# Patient Record
Sex: MALE | Race: BLACK OR AFRICAN AMERICAN | Employment: FULL TIME | URBAN - METROPOLITAN AREA
[De-identification: names, ages, dates, MRNs, and addresses within clinical notes are randomized per-mention and may not be internally consistent; named-entity substitution may affect disease eponyms.]

---
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
## 2020-01-27 ENCOUNTER — HOSPITAL ENCOUNTER (EMERGENCY)
Age: 20
Discharge: HOME | End: 2020-01-27
Attending: EMERGENCY MEDICINE
Payer: COMMERCIAL

## 2020-01-27 VITALS
OXYGEN SATURATION: 100 % | SYSTOLIC BLOOD PRESSURE: 125 MMHG | HEIGHT: 71 IN | TEMPERATURE: 98 F | HEART RATE: 73 BPM | WEIGHT: 132.31 LBS | BODY MASS INDEX: 18.52 KG/M2 | DIASTOLIC BLOOD PRESSURE: 90 MMHG | RESPIRATION RATE: 16 BRPM

## 2020-01-27 DIAGNOSIS — Z02.89 ENCOUNTER TO OBTAIN EXCUSE FROM WORK: ICD-10-CM

## 2020-01-27 DIAGNOSIS — R23.1 PALE COMPLEXION: Primary | ICD-10-CM

## 2020-01-27 LAB
BASOPHILS # BLD: 0 K/UL (ref 0–0.1)
BASOPHILS NFR BLD: 1 % (ref 0–1)
DIFFERENTIAL METHOD BLD: NORMAL
EOSINOPHIL # BLD: 0 K/UL (ref 0–0.4)
EOSINOPHIL NFR BLD: 1 % (ref 0–7)
ERYTHROCYTE [DISTWIDTH] IN BLOOD BY AUTOMATED COUNT: 12.5 % (ref 11.5–14.5)
HCT VFR BLD AUTO: 47.5 % (ref 36.6–50.3)
HGB BLD-MCNC: 15.6 G/DL (ref 12.1–17)
IMM GRANULOCYTES # BLD AUTO: 0 K/UL (ref 0–0.04)
IMM GRANULOCYTES NFR BLD AUTO: 0 % (ref 0–0.5)
LYMPHOCYTES # BLD: 1.6 K/UL (ref 0.8–3.5)
LYMPHOCYTES NFR BLD: 38 % (ref 12–49)
MCH RBC QN AUTO: 27.6 PG (ref 26–34)
MCHC RBC AUTO-ENTMCNC: 32.8 G/DL (ref 30–36.5)
MCV RBC AUTO: 83.9 FL (ref 80–99)
MONOCYTES # BLD: 0.3 K/UL (ref 0–1)
MONOCYTES NFR BLD: 7 % (ref 5–13)
NEUTS SEG # BLD: 2.3 K/UL (ref 1.8–8)
NEUTS SEG NFR BLD: 53 % (ref 32–75)
NRBC # BLD: 0 K/UL (ref 0–0.01)
NRBC BLD-RTO: 0 PER 100 WBC
PLATELET # BLD AUTO: 296 K/UL (ref 150–400)
PMV BLD AUTO: 9.7 FL (ref 8.9–12.9)
RBC # BLD AUTO: 5.66 M/UL (ref 4.1–5.7)
WBC # BLD AUTO: 4.2 K/UL (ref 4.1–11.1)

## 2020-01-27 PROCEDURE — 85025 COMPLETE CBC W/AUTO DIFF WBC: CPT

## 2020-01-27 PROCEDURE — 36415 COLL VENOUS BLD VENIPUNCTURE: CPT

## 2020-01-27 PROCEDURE — 99282 EMERGENCY DEPT VISIT SF MDM: CPT

## 2020-01-27 NOTE — ED PROVIDER NOTES
EMERGENCY DEPARTMENT HISTORY AND PHYSICAL EXAM      Date: 1/27/2020  Patient Name: Jacob Díaz    History of Presenting Illness     Chief Complaint   Patient presents with    Letter for School/Work       History Provided By: Patient    HPI: Jacob Díaz, 23 y.o. male with PMHx significant for asthma. 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 The patient states that the provider who examined him at work did not send him to any primary doctors or write prescription for blood work. Currently denies any chest pain, shortness of breath, abdominal pain, flank pain, syncopal episodes or have any stool movements that are black and tarry or red. He feels like his usual self denies any other acute complaints. Please note for examination and HPI were completed I did introduce myself as the physician assistant. Please note that this dictation was completed with Jemstep, the computer voice recognition software. Quite often unanticipated grammatical, syntax, homophones, and other interpretive errors are inadvertently transcribed by the computer software. Please disregard these errors. Please excuse any errors that have escaped final proofreading. For further clarification on any chart please contact myself. Thank you. There are no other complaints, changes, or physical findings at this time. PCP: None    No current facility-administered medications on file prior to encounter. Current Outpatient Medications on File Prior to Encounter   Medication Sig Dispense Refill    beclomethasone (QVAR) 80 mcg/actuation inhaler Take 2 Puffs by inhalation two (2) times a day.  ALBUTEROL SULFATE (PROAIR HFA IN) Take 2 Puffs by inhalation as needed.       ibuprofen (MOTRIN) 600 mg tablet Take 1 Tab by mouth every six (6) hours as needed for Pain. 20 Tab 0       Past History     Past Medical History:  Past Medical History:   Diagnosis Date    Asthma     Lead poisoning        Past Surgical History:  History reviewed. No pertinent surgical history. Family History:  Family History   Problem Relation Age of Onset    Diabetes Mother     Seizures Mother     Cancer Father         ? where - I think it was prostate cancer.  Diabetes Maternal Grandmother     Thyroid Disease Maternal Grandmother     Cancer Maternal Grandfather 78        pancreatic       Social History:  Social History     Tobacco Use    Smoking status: Never Smoker    Smokeless tobacco: Never Used   Substance Use Topics    Alcohol use: No    Drug use: No       Allergies:  No Known Allergies      Review of Systems   Review of Systems   All other systems reviewed and are negative. Physical Exam   Physical Exam  Vitals signs and nursing note reviewed. Constitutional:       Appearance: He is well-developed. HENT:      Head: Normocephalic and atraumatic. Eyes:      Conjunctiva/sclera: Conjunctivae normal.      Pupils: Pupils are equal, round, and reactive to light. Neck:      Musculoskeletal: Normal range of motion and neck supple. Thyroid: No thyromegaly. Cardiovascular:      Rate and Rhythm: Normal rate and regular rhythm. Heart sounds: Normal heart sounds. No murmur. Pulmonary:      Effort: Pulmonary effort is normal. No respiratory distress. Breath sounds: Normal breath sounds. No stridor. No wheezing. Abdominal:      General: Bowel sounds are normal.      Palpations: Abdomen is soft. Tenderness: There is no tenderness. Musculoskeletal: Normal range of motion. General: No tenderness. Lymphadenopathy:      Cervical: No cervical adenopathy. Skin:     General: Skin is warm. Neurological:      Mental Status: He is alert and oriented to person, place, and time.       Deep Tendon Reflexes: Reflexes are normal and symmetric. Psychiatric:         Judgment: Judgment normal.         Diagnostic Study Results     Labs -     Recent Results (from the past 12 hour(s))   CBC WITH AUTOMATED DIFF    Collection Time: 01/27/20  3:23 PM   Result Value Ref Range    WBC 4.2 4.1 - 11.1 K/uL    RBC 5.66 4.10 - 5.70 M/uL    HGB 15.6 12.1 - 17.0 g/dL    HCT 47.5 36.6 - 50.3 %    MCV 83.9 80.0 - 99.0 FL    MCH 27.6 26.0 - 34.0 PG    MCHC 32.8 30.0 - 36.5 g/dL    RDW 12.5 11.5 - 14.5 %    PLATELET 739 730 - 895 K/uL    MPV 9.7 8.9 - 12.9 FL    NRBC 0.0 0  WBC    ABSOLUTE NRBC 0.00 0.00 - 0.01 K/uL    NEUTROPHILS 53 32 - 75 %    LYMPHOCYTES 38 12 - 49 %    MONOCYTES 7 5 - 13 %    EOSINOPHILS 1 0 - 7 %    BASOPHILS 1 0 - 1 %    IMMATURE GRANULOCYTES 0 0.0 - 0.5 %    ABS. NEUTROPHILS 2.3 1.8 - 8.0 K/UL    ABS. LYMPHOCYTES 1.6 0.8 - 3.5 K/UL    ABS. MONOCYTES 0.3 0.0 - 1.0 K/UL    ABS. EOSINOPHILS 0.0 0.0 - 0.4 K/UL    ABS. BASOPHILS 0.0 0.0 - 0.1 K/UL    ABS. IMM. GRANS. 0.0 0.00 - 0.04 K/UL    DF AUTOMATED         Radiologic Studies -   No orders to display     CT Results  (Last 48 hours)    None        CXR Results  (Last 48 hours)    None            Medical Decision Making   I am the first provider for this patient. I reviewed the vital signs, available nursing notes, past medical history, past surgical history, family history and social history. Vital Signs-Reviewed the patient's vital signs. Patient Vitals for the past 12 hrs:   Temp Pulse Resp BP SpO2   01/27/20 1401 98 °F (36.7 °C) 73 16 125/90 100 %       Records Reviewed: Nursing Notes    Provider Notes (Medical Decision Making): This time we did examine CBC and CBC was in within normal limits. I did encourage patient to follow-up with primary doctors however he can obtain records from medical records and at this time we will go ahead and discharge patient in stable condition to follow-up on outpatient basis.     ED Course:   Initial assessment performed. The patients presenting problems have been discussed, and they are in agreement with the care plan formulated and outlined with them. I have encouraged them to ask questions as they arise throughout their visit. Critical Care Time: None    Disposition:  Dispo    PLAN:  1. Current Discharge Medication List        2. Follow-up Information     Follow up With Specialties Details Why [address] Internal Medicine   Summer Hurtado  [address]  [address] [facility]  [phone]        Return to ED if worse     Diagnosis     Clinical Impression:   1. Pale complexion    2. Encounter to obtain excuse from work          Please note that this dictation was completed with Lumiy, the Xirrus voice recognition software. Quite often unanticipated grammatical, syntax, homophones, and other interpretive errors are inadvertently transcribed by the computer software. Please disregards these errors. Please excuse any errors that have escaped final proofreading. This note will not be viewable in [address]

## 2020-01-27 NOTE — ED NOTES
Discharge instructions were given to the patient by ORDOLFO Melendez. The patient left the Emergency Department ambulatory, alert and oriented and in no acute distress with 0 prescriptions. The patient was encouraged to call or return to the ED for worsening issues or problems and was encouraged to schedule a follow up appointment for continuing care. The patient verbalized understanding of discharge instructions and prescriptions, all questions were answered. The patient has no further concerns at this time.

## 2020-01-27 NOTE — ED TRIAGE NOTES
Pt reports he needs a note saying he is cleared to return to work. Pt reports he had a physical at work and the nurse said he \"looked pale. \"

## 2020-01-27 NOTE — ED NOTES
Pt presents ambulatory to ED stating he was told during his physical that he appears pale and needed lab work. Pt denies any symptoms at this time. Pt is alert and oriented x 4, RR even and unlabored, skin is warm and dry. Assesment completed and pt updated on plan of care. Emergency Department Nursing Plan of Care       The Nursing Plan of Care is developed from the Nursing assessment and Emergency Department Attending provider initial evaluation. The plan of care may be reviewed in the ED Provider note.     The Plan of Care was developed with the following considerations:   Patient / Family readiness to learn indicated by:verbalized understanding  Persons(s) to be included in education: patient  Barriers to Learning/Limitations:No    [address]    1/27/2020   3:07 PM

## 2020-02-05 ENCOUNTER — HOSPITAL ENCOUNTER (OUTPATIENT)
Dept: GENERAL RADIOLOGY | Age: 20
Discharge: HOME | End: 2020-02-05
Payer: COMMERCIAL

## 2020-02-05 DIAGNOSIS — T14.8XXA FRACTURE: ICD-10-CM

## 2020-02-05 PROCEDURE — 73140 X-RAY EXAM OF FINGER(S): CPT

## 2022-03-20 PROBLEM — E80.4 GILBERT SYNDROME: Status: ACTIVE | Noted: 2017-01-03

## 2022-11-26 ENCOUNTER — HOSPITAL ENCOUNTER (EMERGENCY)
Age: 22
Discharge: HOME | End: 2022-11-26
Attending: EMERGENCY MEDICINE
Payer: COMMERCIAL

## 2022-11-26 ENCOUNTER — APPOINTMENT (OUTPATIENT)
Dept: GENERAL RADIOLOGY | Age: 22
End: 2022-11-26
Attending: NURSE PRACTITIONER
Payer: COMMERCIAL

## 2022-11-26 VITALS
HEIGHT: 71 IN | DIASTOLIC BLOOD PRESSURE: 78 MMHG | SYSTOLIC BLOOD PRESSURE: 112 MMHG | BODY MASS INDEX: 20.3 KG/M2 | WEIGHT: 145 LBS | HEART RATE: 90 BPM | RESPIRATION RATE: 19 BRPM | OXYGEN SATURATION: 97 % | TEMPERATURE: 99.4 F

## 2022-11-26 DIAGNOSIS — J10.1 INFLUENZA A: Primary | ICD-10-CM

## 2022-11-26 LAB
ALBUMIN SERPL-MCNC: 4.6 G/DL (ref 3.5–5)
ALBUMIN/GLOB SERPL: 1 {RATIO} (ref 1.1–2.2)
ALP SERPL-CCNC: 82 U/L (ref 45–117)
ALT SERPL-CCNC: 62 U/L (ref 12–78)
ANION GAP SERPL CALC-SCNC: 11 MMOL/L (ref 5–15)
APPEARANCE UR: CLEAR
AST SERPL-CCNC: 44 U/L (ref 15–37)
BACTERIA URNS QL MICRO: NEGATIVE /HPF
BASOPHILS # BLD: 0 K/UL (ref 0–0.1)
BASOPHILS NFR BLD: 0 % (ref 0–1)
BILIRUB SERPL-MCNC: 2.8 MG/DL (ref 0.2–1)
BILIRUB UR QL CFM: NEGATIVE
BUN SERPL-MCNC: 12 MG/DL (ref 6–20)
BUN/CREAT SERPL: 13 (ref 12–20)
CALCIUM SERPL-MCNC: 9.5 MG/DL (ref 8.5–10.1)
CHLORIDE SERPL-SCNC: 95 MMOL/L (ref 97–108)
CO2 SERPL-SCNC: 31 MMOL/L (ref 21–32)
COLOR UR: ABNORMAL
CREAT SERPL-MCNC: 0.89 MG/DL (ref 0.7–1.3)
DIFFERENTIAL METHOD BLD: ABNORMAL
EOSINOPHIL # BLD: 0 K/UL (ref 0–0.4)
EOSINOPHIL NFR BLD: 0 % (ref 0–7)
EPITH CASTS URNS QL MICRO: ABNORMAL /LPF
ERYTHROCYTE [DISTWIDTH] IN BLOOD BY AUTOMATED COUNT: 12.5 % (ref 11.5–14.5)
FLUAV AG NPH QL IA: POSITIVE
FLUBV AG NOSE QL IA: NEGATIVE
GLOBULIN SER CALC-MCNC: 4.6 G/DL (ref 2–4)
GLUCOSE SERPL-MCNC: 78 MG/DL (ref 65–100)
GLUCOSE UR STRIP.AUTO-MCNC: NEGATIVE MG/DL
HCT VFR BLD AUTO: 47.2 % (ref 36.6–50.3)
HGB BLD-MCNC: 15.8 G/DL (ref 12.1–17)
HGB UR QL STRIP: ABNORMAL
IMM GRANULOCYTES # BLD AUTO: 0 K/UL (ref 0–0.04)
IMM GRANULOCYTES NFR BLD AUTO: 0 % (ref 0–0.5)
KETONES UR QL STRIP.AUTO: >80 MG/DL
LEUKOCYTE ESTERASE UR QL STRIP.AUTO: ABNORMAL
LYMPHOCYTES # BLD: 1 K/UL (ref 0.8–3.5)
LYMPHOCYTES NFR BLD: 22 % (ref 12–49)
MCH RBC QN AUTO: 27.8 PG (ref 26–34)
MCHC RBC AUTO-ENTMCNC: 33.5 G/DL (ref 30–36.5)
MCV RBC AUTO: 83 FL (ref 80–99)
MONOCYTES # BLD: 0.8 K/UL (ref 0–1)
MONOCYTES NFR BLD: 18 % (ref 5–13)
NEUTS SEG # BLD: 2.7 K/UL (ref 1.8–8)
NEUTS SEG NFR BLD: 60 % (ref 32–75)
NITRITE UR QL STRIP.AUTO: NEGATIVE
NRBC # BLD: 0 K/UL (ref 0–0.01)
NRBC BLD-RTO: 0 PER 100 WBC
PH UR STRIP: 5.5 [PH] (ref 5–8)
PLATELET # BLD AUTO: 238 K/UL (ref 150–400)
PMV BLD AUTO: 9.9 FL (ref 8.9–12.9)
POTASSIUM SERPL-SCNC: 3.5 MMOL/L (ref 3.5–5.1)
PROT SERPL-MCNC: 9.2 G/DL (ref 6.4–8.2)
PROT UR STRIP-MCNC: 30 MG/DL
RBC # BLD AUTO: 5.69 M/UL (ref 4.1–5.7)
RBC #/AREA URNS HPF: ABNORMAL /HPF (ref 0–5)
SODIUM SERPL-SCNC: 137 MMOL/L (ref 136–145)
SP GR UR REFRACTOMETRY: 1.03 (ref 1–1.03)
UA: UC IF INDICATED,UAUC: ABNORMAL
UROBILINOGEN UR QL STRIP.AUTO: 1 EU/DL (ref 0.2–1)
WBC # BLD AUTO: 4.6 K/UL (ref 4.1–11.1)
WBC URNS QL MICRO: ABNORMAL /HPF (ref 0–4)

## 2022-11-26 PROCEDURE — 81001 URINALYSIS AUTO W/SCOPE: CPT

## 2022-11-26 PROCEDURE — 99284 EMERGENCY DEPT VISIT MOD MDM: CPT

## 2022-11-26 PROCEDURE — 74011250636 HC RX REV CODE- 250/636: Performed by: NURSE PRACTITIONER

## 2022-11-26 PROCEDURE — 87804 INFLUENZA ASSAY W/OPTIC: CPT

## 2022-11-26 PROCEDURE — 71045 X-RAY EXAM CHEST 1 VIEW: CPT

## 2022-11-26 PROCEDURE — 80053 COMPREHEN METABOLIC PANEL: CPT

## 2022-11-26 PROCEDURE — 85025 COMPLETE CBC W/AUTO DIFF WBC: CPT

## 2022-11-26 PROCEDURE — 36415 COLL VENOUS BLD VENIPUNCTURE: CPT

## 2022-11-26 RX ORDER — IBUPROFEN 600 MG/1
600 TABLET ORAL
Qty: 20 TABLET | Refills: 0 | Status: SHIPPED | OUTPATIENT
Start: 2022-11-26

## 2022-11-26 RX ORDER — DEXTROMETHORPHAN HYDROBROMIDE, GUAIFENESIN 20; 400 MG/20ML; MG/20ML
SOLUTION ORAL
Qty: 118 ML | Refills: 0 | Status: SHIPPED | OUTPATIENT
Start: 2022-11-26

## 2022-11-26 RX ADMIN — SODIUM CHLORIDE 1000 ML: 9 INJECTION, SOLUTION INTRAVENOUS at 11:23

## 2022-11-26 NOTE — ED PROVIDER NOTES
EMERGENCY DEPARTMENT HISTORY AND PHYSICAL EXAM          Date: 11/26/2022  Patient Name: Karon Darden    History of Presenting Illness     Chief Complaint   Patient presents with    Cough     Per pt reports productive cough with green sputum x 3 weeks, +chills and fever. History Provided By: Patient    Chief Complaint: fatigue  Duration: 3 Weeks  Timing:  Acute  Location: generalized body weakness  Quality:  feeling tired no energy  Severity: Moderate  Modifying Factors: none  Associated Symptoms:  Cough no appetite  chills    HPI: Karon Darden is a 25 y.o. male with a PMH of asthma who presents with fatigue acute onset 3 weeks ago. Patient states she has has been tired is had no energy. Patient states past 2 days he has had hot and cold chills and a fever. Patient states she has not felt like eating. He denies abdominal pain nausea vomiting diarrhea wheezing shortness of breath or chest pain. PCP: None    Current Outpatient Medications   Medication Sig Dispense Refill    ibuprofen (MOTRIN) 600 mg tablet Take 1 Tablet by mouth every six (6) hours as needed for Pain. 20 Tablet 0    dextromethorphan-guaiFENesin (Robitussin Cough-Chest Dallin DM) 5-100 mg/5 mL liqd Take 10 ml every 6 hours as needed for cough 118 mL 0    beclomethasone (QVAR) 80 mcg/actuation aero Take 2 Puffs by inhalation two (2) times a day. (Patient not taking: Reported on 11/26/2022)      ALBUTEROL SULFATE (PROAIR HFA IN) Take 2 Puffs by inhalation as needed. (Patient not taking: Reported on 11/26/2022)         Past History     Past Medical History:  Past Medical History:   Diagnosis Date    Asthma     Lead poisoning        Past Surgical History:  History reviewed. No pertinent surgical history. Family History:  Family History   Problem Relation Age of Onset    Diabetes Mother     Seizures Mother     Cancer Father         ? where - I think it was prostate cancer.     Diabetes Maternal Grandmother     Thyroid Disease Maternal Grandmother     Cancer Maternal Grandfather 78        pancreatic       Social History:  Social History     Tobacco Use    Smoking status: Never    Smokeless tobacco: Never   Substance Use Topics    Alcohol use: No    Drug use: No       Allergies:  No Known Allergies      Review of Systems   Review of Systems   Constitutional:  Positive for activity change, chills and fatigue. Negative for fever. HENT:  Negative for congestion and sore throat. Eyes:  Negative for redness. Respiratory:  Positive for cough. Negative for chest tightness and wheezing. Cardiovascular:  Negative for chest pain. Gastrointestinal:  Negative for abdominal pain. Genitourinary:  Negative for dysuria. Musculoskeletal:  Negative for arthralgias, back pain, myalgias, neck pain and neck stiffness. Skin:  Negative for rash. Neurological:  Negative for dizziness, syncope, weakness, light-headedness, numbness and headaches. Hematological:  Negative for adenopathy. All other systems reviewed and are negative. Physical Exam     Vitals:    11/26/22 1041   BP: 112/78   Pulse: 90   Resp: 19   Temp: 99.4 °F (37.4 °C)   SpO2: 97%   Weight: 65.8 kg (145 lb)   Height: 5' 11\" (1.803 m)     Physical Exam  Vitals and nursing note reviewed. Constitutional:       Appearance: He is well-developed. Comments: Tired appearing 26-year-old   HENT:      Head: Normocephalic and atraumatic. Right Ear: External ear normal.      Mouth/Throat:      Mouth: Mucous membranes are dry. Pharynx: No oropharyngeal exudate or posterior oropharyngeal erythema. Eyes:      General:         Right eye: No discharge. Left eye: No discharge. Conjunctiva/sclera: Conjunctivae normal.   Cardiovascular:      Rate and Rhythm: Normal rate and regular rhythm. Heart sounds: Normal heart sounds. Pulmonary:      Effort: Pulmonary effort is normal. No respiratory distress. Breath sounds: Normal breath sounds. No wheezing. Abdominal:      General: Bowel sounds are normal.      Palpations: Abdomen is soft. Tenderness: There is no abdominal tenderness. Musculoskeletal:         General: Normal range of motion. Cervical back: Normal range of motion and neck supple. Lymphadenopathy:      Cervical: No cervical adenopathy. Skin:     General: Skin is warm and dry. Neurological:      Mental Status: He is alert and oriented to person, place, and time. Cranial Nerves: No cranial nerve deficit. Psychiatric:         Behavior: Behavior normal.         Thought Content: Thought content normal.         Judgment: Judgment normal.             Medical Decision Making   I am the first provider for this patient. I reviewed the vital signs, available nursing notes, past medical history, past surgical history, family history and social history. Vital Signs-Reviewed the patient's vital signs. Records Reviewed: Nursing Notes    Provider Notes (Medical Decision Making):   DDX influenza dehydration URI            Procedures:  Procedures    Diagnostic Study Results     Labs -     Recent Results (from the past 12 hour(s))   METABOLIC PANEL, COMPREHENSIVE    Collection Time: 11/26/22 11:15 AM   Result Value Ref Range    Sodium 137 136 - 145 mmol/L    Potassium 3.5 3.5 - 5.1 mmol/L    Chloride 95 (L) 97 - 108 mmol/L    CO2 31 21 - 32 mmol/L    Anion gap 11 5 - 15 mmol/L    Glucose 78 65 - 100 mg/dL    BUN 12 6 - 20 MG/DL    Creatinine 0.89 0.70 - 1.30 MG/DL    BUN/Creatinine ratio 13 12 - 20      eGFR >60 >60 ml/min/1.73m2    Calcium 9.5 8.5 - 10.1 MG/DL    Bilirubin, total 2.8 (H) 0.2 - 1.0 MG/DL    ALT (SGPT) 62 12 - 78 U/L    AST (SGOT) 44 (H) 15 - 37 U/L    Alk.  phosphatase 82 45 - 117 U/L    Protein, total 9.2 (H) 6.4 - 8.2 g/dL    Albumin 4.6 3.5 - 5.0 g/dL    Globulin 4.6 (H) 2.0 - 4.0 g/dL    A-G Ratio 1.0 (L) 1.1 - 2.2     CBC WITH AUTOMATED DIFF    Collection Time: 11/26/22 11:15 AM   Result Value Ref Range    WBC 4.6 4.1 - 11.1 K/uL    RBC 5.69 4.10 - 5.70 M/uL    HGB 15.8 12.1 - 17.0 g/dL    HCT 47.2 36.6 - 50.3 %    MCV 83.0 80.0 - 99.0 FL    MCH 27.8 26.0 - 34.0 PG    MCHC 33.5 30.0 - 36.5 g/dL    RDW 12.5 11.5 - 14.5 %    PLATELET 381 885 - 947 K/uL    MPV 9.9 8.9 - 12.9 FL    NRBC 0.0 0  WBC    ABSOLUTE NRBC 0.00 0.00 - 0.01 K/uL    NEUTROPHILS 60 32 - 75 %    LYMPHOCYTES 22 12 - 49 %    MONOCYTES 18 (H) 5 - 13 %    EOSINOPHILS 0 0 - 7 %    BASOPHILS 0 0 - 1 %    IMMATURE GRANULOCYTES 0 0.0 - 0.5 %    ABS. NEUTROPHILS 2.7 1.8 - 8.0 K/UL    ABS. LYMPHOCYTES 1.0 0.8 - 3.5 K/UL    ABS. MONOCYTES 0.8 0.0 - 1.0 K/UL    ABS. EOSINOPHILS 0.0 0.0 - 0.4 K/UL    ABS. BASOPHILS 0.0 0.0 - 0.1 K/UL    ABS. IMM. GRANS. 0.0 0.00 - 0.04 K/UL    DF AUTOMATED     URINALYSIS W/ REFLEX CULTURE    Collection Time: 11/26/22 11:15 AM    Specimen: Miscellaneous sample    Urine specimen   Result Value Ref Range    Color DARK YELLOW      Appearance CLEAR CLEAR      Specific gravity 1.030 1.003 - 1.030      pH (UA) 5.5 5.0 - 8.0      Protein 30 (A) NEG mg/dL    Glucose Negative NEG mg/dL    Ketone >80 (A) NEG mg/dL    Blood MODERATE (A) NEG      Urobilinogen 1.0 0.2 - 1.0 EU/dL    Nitrites Negative NEG      Leukocyte Esterase TRACE (A) NEG      WBC 0-4 0 - 4 /hpf    RBC 0-5 0 - 5 /hpf    Epithelial cells MODERATE (A) FEW /lpf    Bacteria Negative NEG /hpf    UA:UC IF INDICATED CULTURE NOT INDICATED BY UA RESULT CNI     INFLUENZA A+B VIRAL AGS    Collection Time: 11/26/22 11:15 AM   Result Value Ref Range    Influenza A Antigen Positive (A) NEG      Influenza B Antigen Negative NEG     BILIRUBIN, CONFIRM    Collection Time: 11/26/22 11:15 AM   Result Value Ref Range    Bilirubin UA, confirm Negative NEG         Radiologic Studies -   XR CHEST PORT   Final Result   No acute cardiopulmonary process.            CT Results  (Last 48 hours)      None          CXR Results  (Last 48 hours)                 11/26/22 1134  XR CHEST PORT Final result Impression:  No acute cardiopulmonary process. Narrative:  EXAM:  XR CHEST PORT       INDICATION:   chest pain       COMPARISON: Chest radiograph 5/10/2019. FINDINGS: AP radiograph of the chest was obtained. No evidence of focal consolidation. No pleural effusion or pneumothorax. Heart,   kendal, mediastinum are within normal limits. No acute osseous abnormalities. Disposition:  home    DISCHARGE NOTE:         Care plan outlined and precautions discussed. Patient has no new complaints, changes, or physical findings. Results of tests were reviewed with the patient. All medications were reviewed with the patient; will d/c home with robitussin dm. All of pt's questions and concerns were addressed. Patient was instructed and agrees to follow up with PCP, as well as to return to the ED upon further deterioration. Patient is ready to go home. Follow-up Information       Follow up With Specialties Details Why [address] Internal Medicine In 1 week As needed Summer Hurtado  [id number] [place] [address] [address]            Current Discharge Medication List        START taking these medications    Details   dextromethorphan-guaiFENesin (Robitussin Cough-Chest Dallin DM) 5-100 mg/5 mL liqd Take 10 ml every 6 hours as needed for cough  Qty: 118 mL, Refills: 0  Start date: 11/26/2022           CONTINUE these medications which have CHANGED    Details   ibuprofen (MOTRIN) 600 mg tablet Take 1 Tablet by mouth every six (6) hours as needed for Pain. Qty: 20 Tablet, Refills: 0  Start date: 11/26/2022               Please note that this dictation was completed with Dragon, computer voice recognition software. Quite often unanticipated grammatical, syntax, homophones, and other interpretive errors are inadvertently transcribed by the computer software. Please disregard these errors.   Additionally, please excuse any errors that have escaped final proofreading. Diagnosis     Clinical Impression:   1.  Influenza A

## 2022-11-26 NOTE — LETTER
[facility] EMERGENCY DEPT  [address] [facility] [address] [phone]    Work/School Note    Date: 11/26/2022    To Whom It May concern:    Isis Santizo was seen and treated today in the emergency room by the following provider(s):  Attending Provider: Pete Andrews MD  Nurse Practitioner: Rossy Treadwell NP. Isis Santizo may return to work on 11-29-22.     Sincerely,          Emir Hilliard NP

## 2022-11-26 NOTE — ED NOTES
Patient (s)  given copy of dc instructions and 2 script(s). Patient (s)  verbalized understanding of instructions and script (s). Patient given a current medication reconciliation form and verbalized understanding of their medications. Patient (s) verbalized understanding of the importance of discussing medications with  his or her physician or clinic they will be following up with. Patient alert and oriented and in no acute distress. Patient discharged home ambulatory with self. Patient advised [employer] will cover the test.  He would like to schedule it if his PCP suggest.

## 2022-11-26 NOTE — ED NOTES
Emergency Department Nursing Plan of Care       The Nursing Plan of Care is developed from the Nursing assessment and Emergency Department Attending provider initial evaluation. The plan of care may be reviewed in the ED Provider note.     The Plan of Care was developed with the following considerations:   Patient / Family readiness to learn indicated by:verbalized understanding  Persons(s) to be included in education: patient  Barriers to Learning/Limitations:No    Signed     Sandra Nuno RN    11/26/2022   11:18 AM

## 2024-06-26 ENCOUNTER — HOSPITAL ENCOUNTER (EMERGENCY)
Facility: HOSPITAL | Age: 24
Discharge: HOME | End: 2024-06-26
Payer: COMMERCIAL

## 2024-06-26 VITALS
DIASTOLIC BLOOD PRESSURE: 64 MMHG | BODY MASS INDEX: 21 KG/M2 | SYSTOLIC BLOOD PRESSURE: 102 MMHG | RESPIRATION RATE: 16 BRPM | HEART RATE: 76 BPM | HEIGHT: 71 IN | WEIGHT: 150 LBS | TEMPERATURE: 97.9 F | OXYGEN SATURATION: 98 %

## 2024-06-26 DIAGNOSIS — Z48.02 ENCOUNTER FOR STAPLE REMOVAL: ICD-10-CM

## 2024-06-26 DIAGNOSIS — Z48.02 ENCOUNTER FOR REMOVAL OF SUTURES: Primary | ICD-10-CM

## 2024-06-26 PROCEDURE — 99282 EMERGENCY DEPT VISIT SF MDM: CPT

## 2024-06-26 ASSESSMENT — PAIN SCALES - GENERAL: PAINLEVEL_OUTOF10: 0

## 2024-06-26 ASSESSMENT — PAIN - FUNCTIONAL ASSESSMENT: PAIN_FUNCTIONAL_ASSESSMENT: 0-10

## 2024-06-26 NOTE — ED PROVIDER NOTES
[facility] EMERGENCY DEPT  EMERGENCY DEPARTMENT ENCOUNTER       Pt Name: Ross Pro  MRN: [id number]  Birthdate 2000  Date of evaluation: 6/26/2024  Provider: TRACY Fisher   PCP: No primary care provider on file.  Note Started: 3:37 PM EDT 6/26/24     CHIEF COMPLAINT       Chief Complaint   Patient presents with    Suture / Staple Removal        HISTORY OF PRESENT ILLNESS: 1 or more elements      History From: Patient  None     Ross Pro is a 24 y.o. male who presents to the ED for evaluation of staple and suture removal. Patient states he was in [place] 6 days ago when this occurred.      Nursing Notes were all reviewed and agreed with or any disagreements were addressed in the HPI.     REVIEW OF SYSTEMS      Review of Systems   All other systems reviewed and are negative.       Positives and Pertinent negatives as per HPI.    PAST HISTORY     Past Medical History:  Past Medical History:   Diagnosis Date    Asthma     Lead poisoning        Past Surgical History:  History reviewed. No pertinent surgical history.    Family History:  Family History   Problem Relation Age of Onset    Cancer Father         ? where - I think it was prostate cancer.    Diabetes Maternal Grandmother     Cancer Maternal Grandfather 79        pancreatic    Seizures Mother     Diabetes Mother     Thyroid Disease Maternal Grandmother        Social History:  Social History     Tobacco Use    Smoking status: Never    Smokeless tobacco: Never   Substance Use Topics    Alcohol use: No    Drug use: No       Allergies:  No Known Allergies        PHYSICAL EXAM      ED Triage Vitals [06/26/24 1505]   Enc Vitals Group      /64      Pulse 76      Respirations 16      Temp 97.9 °F (36.6 °C)      Temp Source Oral      SpO2 98 %      Weight - Scale 68 kg (150 lb)      Height 1.803 m (5' 11\")      Head Circumference       Peak Flow       Pain Score       Pain Loc       Pain Edu?       Excl. in GC?         Physical

## 2024-06-26 NOTE — ED TRIAGE NOTES
Pt presents for removal of sutures to left side of face where gauze is stuck to stitches. Pt also reports staples to left knee

## 2024-06-26 NOTE — DISCHARGE INSTRUCTIONS
Thank You!    It was a pleasure taking care of you in our Emergency Department today. We know that when you come to [facility], you are entrusting us with your health, comfort, and safety. Our clinicians honor that trust, and truly appreciate the opportunity to care for you and your loved ones.      We also value your feedback. If you receive a survey about your Emergency Department experience today, please fill it out.  We care about our patients' feedback, and we listen to what you have to say. Thank you.    Kandy Turpin PA-C